# Patient Record
Sex: MALE | Race: OTHER | URBAN - METROPOLITAN AREA
[De-identification: names, ages, dates, MRNs, and addresses within clinical notes are randomized per-mention and may not be internally consistent; named-entity substitution may affect disease eponyms.]

---

## 2022-05-18 ENCOUNTER — EMERGENCY (EMERGENCY)
Facility: HOSPITAL | Age: 42
LOS: 0 days | Discharge: HOME | End: 2022-05-18
Attending: EMERGENCY MEDICINE | Admitting: EMERGENCY MEDICINE
Payer: MEDICAID

## 2022-05-18 VITALS
DIASTOLIC BLOOD PRESSURE: 80 MMHG | HEART RATE: 68 BPM | RESPIRATION RATE: 19 BRPM | HEIGHT: 65 IN | OXYGEN SATURATION: 99 % | TEMPERATURE: 97 F | WEIGHT: 152.12 LBS | SYSTOLIC BLOOD PRESSURE: 160 MMHG

## 2022-05-18 DIAGNOSIS — N48.89 OTHER SPECIFIED DISORDERS OF PENIS: ICD-10-CM

## 2022-05-18 DIAGNOSIS — R30.0 DYSURIA: ICD-10-CM

## 2022-05-18 LAB
APPEARANCE UR: CLEAR — SIGNIFICANT CHANGE UP
BILIRUB UR-MCNC: NEGATIVE — SIGNIFICANT CHANGE UP
COLOR SPEC: SIGNIFICANT CHANGE UP
DIFF PNL FLD: NEGATIVE — SIGNIFICANT CHANGE UP
GLUCOSE UR QL: NEGATIVE — SIGNIFICANT CHANGE UP
KETONES UR-MCNC: NEGATIVE — SIGNIFICANT CHANGE UP
LEUKOCYTE ESTERASE UR-ACNC: NEGATIVE — SIGNIFICANT CHANGE UP
NITRITE UR-MCNC: NEGATIVE — SIGNIFICANT CHANGE UP
PH UR: 6 — SIGNIFICANT CHANGE UP (ref 5–8)
PROT UR-MCNC: NEGATIVE — SIGNIFICANT CHANGE UP
SP GR SPEC: 1.02 — SIGNIFICANT CHANGE UP (ref 1.01–1.03)
UROBILINOGEN FLD QL: SIGNIFICANT CHANGE UP

## 2022-05-18 PROCEDURE — 99283 EMERGENCY DEPT VISIT LOW MDM: CPT

## 2022-05-18 RX ORDER — PENICILLIN G BENZATHINE 1200000 [IU]/2ML
2.4 INJECTION, SUSPENSION INTRAMUSCULAR ONCE
Refills: 0 | Status: COMPLETED | OUTPATIENT
Start: 2022-05-18 | End: 2022-05-18

## 2022-05-18 RX ADMIN — PENICILLIN G BENZATHINE 2.4 MILLION UNIT(S): 1200000 INJECTION, SUSPENSION INTRAMUSCULAR at 22:16

## 2022-05-18 NOTE — ED PROVIDER NOTE - PHYSICAL EXAMINATION
CONSTITUTIONAL: Well-developed; well-nourished; in no acute distress, nontoxic appearing  SKIN: skin exam is warm and dry  ABD: soft; non-distended; non-tender.   : discoloration to glans of penis without erythema/warmth or fluctuance, no discharge. Performed by Dr Triplett  NEURO: awake, alert, following commands, oriented, grossly unremarkable. No Focal deficits. GCS 15.   PSYCH: Cooperative, appropriate.

## 2022-05-18 NOTE — ED PROVIDER NOTE - PATIENT PORTAL LINK FT
You can access the FollowMyHealth Patient Portal offered by [facility] by registering at the following website: [email or web address]. By joining [facility]’s FollowMyHealth portal, you will also be able to view your health information using other applications (apps) compatible with our system.

## 2022-05-18 NOTE — ED PROVIDER NOTE - PROVIDER TOKENS
PROVIDER:[TOKEN:[[id number]],FOLLOWUP:[1-3 Days]],PROVIDER:[TOKEN:[[id number]],FOLLOWUP:[1-3 Days]]

## 2022-05-18 NOTE — ED ADULT TRIAGE NOTE - CHIEF COMPLAINT QUOTE
" I think I have infection on my penis, the skin was off and it hurts jennifer when I urinate for a week."

## 2022-05-18 NOTE — ED PROVIDER NOTE - NS ED ROS FT
Review of Systems:  	•	CONSTITUTIONAL: no fever  	•	SKIN: no rash  	•	GI: no abd pain, no nausea, no vomiting  	•	GENITO-URINARY: +dysuria, no testicular pain/redness. no penile discharge  	•	MUSCULOSKELETAL: no joint paint, no swelling, no redness  	•	NEUROLOGIC: no weakness, no headache   	•	PSYCH: no anxiety, non suicidal, non homicidal, no hallucination, no depression

## 2022-05-18 NOTE — ED PROVIDER NOTE - CARE PROVIDER_API CALL
Fernando Pillai)  Urology  [address] [place] [address], [address]  [place] [address]  Phone: [phone]  Fax: [phone]  Follow Up Time: 1-3 Days    Andrews Renee)  Urology  [address] [place] [address], [facility] [address]  [place] [address]  Phone: [phone]  Fax: [phone]  Follow Up Time: 1-3 Days

## 2022-05-18 NOTE — ED ADULT NURSE NOTE - CAS DISCH CONDITION
Emergency Department Discharge Information for Ladonna Dougherty was seen in the [facility] Emergency Department today for viral pharyngitis by Dr. Byrd.    We recommend that you rest, drink a lot of fluids. Recommended if persistent fever, vomiting, dehydration, difficulty in breathing or any changes or worsening of symptoms needs to come back for further evaluation or else follow up with the PCP in 2-3 days. Parents verbalized understanding and didn't have any further questions.             Medication side effect information:  All medicines may cause side effects. However, most people have no side effects or only have minor side effects.     People can be allergic to any medicine. Signs of an allergic reaction include rash, difficulty breathing or swallowing, wheezing, or unexplained swelling. If she has difficulty breathing or swallowing, call 911 or go right to the Emergency Department. For rash or other concerns, call her doctor.     If you have questions about side effects, please ask our staff. If you have questions about side effects or allergic reactions after you go home, ask your doctor or a pharmacist.     Some possible side effects of the medicines we are recommending for Ladonna are:     Ibuprofen  (Motrin, Advil. For fever or pain.)  - Upset stomach or vomiting  - Long term use may cause bleeding in the stomach or intestines. See her doctor if she has black or bloody vomit or stool (poop).      
Improved

## 2022-05-18 NOTE — ED PROVIDER NOTE - ATTENDING CONTRIBUTION TO CARE
42 y M to ED with penile pain.  pain with demarcation to dorsum of penis at 12 oclock with darkening of skin.  no d/c, no bleeding, no STI risk

## 2022-05-18 NOTE — ED PROVIDER NOTE - OBJECTIVE STATEMENT
42 year old male, no past medical history, who presents with penile pain. patient endorses penile pain x1 month described as irritation with dysuria prompting ed eval. denies fever, chills, abd pain, back pain, nausea/vomiting, testicular pain/swelling, bowel changes. patient sexually active, one partner ~20 years, no hx sti/stds.

## 2022-05-18 NOTE — ED PROVIDER NOTE - CARE PROVIDERS DIRECT ADDRESSES
[email or web address]

## 2022-05-18 NOTE — ED PROVIDER NOTE - NSFOLLOWUPINSTRUCTIONS_ED_ALL_ED_FT
Penis Problems  Itchy Penis and Penis Pain  Authored by Dr Marissa Guillermo MBE, Reviewed by Dr Donnie Julian | Last edited 7 Sep 2021 | Meets Patient’s editorial guidelines  In this series:   Premature Ejaculation     Balanitis     Peyronie's Disease (Bent Penis)     Hypospadias     Penile Cancer     Circumcision     Phimosis and Paraphimosis  There are a number of  penis problems that can result in penis pain, an itchy penis or general penile discomfort. Some penis problems to be aware of and the symptoms to watch out for are:    IN THIS ARTICLE  Itchy penis  Penis pain  Peyronie's disease  Hypospadias  Discharge from the penis  Lumps on the penis  Circumcision  Erection dysfunction  Penile cancer  Itchy penis  An itchy penis is often due to a fungal infection such as thrush. This is also known as candidiasis. It can be a sign of diabetes mellitus.    Alternatively, an itchy penis can sometimes be a symptom of a condition called balanitis. This is the term used to describe inflammation at the head of the penis, which can also cause penis pain. The tip of the penis may also be red or sore. In addition, you may notice pain when you pass water or make love and sometimes bleeding or a smell coming from your foreskin, which usually develops over a few days.    Balanitis can be caused by poor hygiene and infections picked up during sexual activity or sexual intercourse, such as genital herpes, genital warts and crabs (public lice). It can also be caused by infections not linked to sexual activity, such as thrush.    Our separate leaflet on Balanitis has more information.    Infections that can result in an itchy penis and affect the shaft of the penis include thrush, allergies or irritants, or as part of a more generalised skin problem such as psoriasis.    An itchy penis can sometimes be a cause of a lower urinary tract infection in men (UTI), which can again be a source of penile discomfort and penis pain.    A doctor can often diagnose the condition just by its appearance, but sometimes tests are needed. The treatment depends on the cause.    Patient.info  Our picks for Penis Problems (Itchy Penis and Penis Pain)  Penis problems you shouldn't ignore  Changes in the penis, testicles, scrotum and urinary habits can indicate something is wrong and ...     5min  Red spots? Fleshy bumps? When to worry about spots on the penis 5min  The male reproductive system  How erection problems can affect your relationship 4min  Penis pain  There are several causes of penis pain which we discuss in more detail below. Causes of penis pain include:    An injury due to a fall or during sports.  Strenuous sexual activity.  Sexually transmitted infections.  Non-sexually transmitted infections, such as yeast infections leading to balanitis.  A paraphimosis, where the foreskin has been pulled back and gets stuck (this is a medical emergency).  Peyronie's disease.  Peyronie's disease  In this condition, thickened areas of scar tissue (fibrous plaques) appear along the shaft of the penis. As a result the penis can develop a bend or assume a deformed shape. The exact cause is unknown but there are several theories. With Peyronie's disease, painful erections and problems with penetration during sex are common.    In most cases tests are not needed, but occasionally a scan is suggested if a blood circulation problem affecting the penis is suspected. The medical treatment includes:    Medication that can be swallowed; or  Medicines applied directly to the surface of the penis; or  Injections into the areas of scarring.  Other treatment options are available. In most cases, the condition stays the same or becomes worse, but occasionally it gets better with time.    See the separate leaflet called Peyronie's Disease for additional information.    Hypospadias  Your urethra is the tube running from your bladder down the length of your penis, which your urine comes out of. Hypospadias is a malformation of the urethra and penis that some babies are born with. It can cause difficulties with the urine flow. In later life, there may be erection problems.    There are a number of different forms. In mild cases, the urethra may just open lower down from its usual position. In severe forms, it can open right down at the base of the penis, by the scrotum. Other abnormalities of the foreskin or the underside of the penis may also occur.    Tests are not usually needed unless it is suspected that the abnormalities of the penis are part of a wider disorder (such as 'intersex syndrome'). Treatment may not be needed for milder cases, but many patients require some form of corrective surgery. This is particularly true if there are problems with the direction of urine flow or erection difficulties.    See the separate leaflet called Hypospadias for additional information.    Worried about your health?  Find a range of men's health services, delivered by local professionals today    Book now  Discharge from the penis  Discharge from the penis is usually caused by inflammation of the urethra. The medical term is urethritis. You may notice a burning pain or urge to pass urine, but this doesn't always happen. Some men don't get any symptoms.    Usually, the cause is a sexually transmitted disease or infection. The common causes are gonorrhoea and non-gonococcal urethritis (ONEIL). It's at its most common in younger men, especially men who have sex with men.    As the name suggests, infections other than gonorrhoea can be a cause of ONEIL. Chlamydia, a germ (bacterium) is a common cause. Irritants to the urethra, such as soap, spermicides or putting a tube (catheter) in to drain the bladder can also be to blame. A cause can't always be identified.    If an infection is the cause, it may go away without medical treatment, but this can take several months. You may still be infectious, even if the symptoms have gone.    If you have a discharge from the penis, get checked out by your GP, a sexual health clinic, or a genitourinary medicine (GUM) clinic. Don't have sex until you and your partner(s) have been tested. Even after treatment, you must avoid sex until it's shown that any infection has been cleared by treatment.    See the separate leaflets called Gonorrhoea, Non-gonococcal Urethritis and Urethritis and Urethral Discharge in Men for additional information.    Lumps on the penis  A common cause for these is warts, also called anogenital warts or simply genital warts. They look like little lumps on the outer skin of the penis. They also sometimes occur around the anus. They are caused by infection with the human papillomavirus (HPV). They are passed on by skin-to-skin contact. This is mainly during sexual contact, but can occur in other circumstances (eg, from mother to baby during birth). Warts on the penis usually don't cause any penis pain or symptoms but people seek treatment because of their appearance. It's best to visit a GUM clinic to get checked out for other infections.    Genital warts are not cancers, but the risk of some cancers (genital and penile cancer, cancer of the mouth, throat and neck). HPV infection is linked to cervical cancer in women.    The warts can be left alone, or removed by a variety of methods such as chemicals, freezing, burning, lasers or surgery. New warts develop after treatment in 1 in 4 cases, due to reactivation of the infection.    Condoms - both male and female - help reduce the risk of acquiring genital warts but don't abolish it because of contact with uncovered skin. Sex toys can pass on the virus and should not be shared. A vaccine is now available against HPV infection which is offered to boys and girls aged 12 to 13 years.    See the separate leaflet called Anogenital Warts for additional information.    Circumcision  This is an operation in which the foreskin is cut off and the remaining skin stitched back. It is usually performed for a condition called phimosis. Phimosis means tightening of the foreskin. This can result in difficulties in pulling the foreskin over the head of the penis and sometimes penis pain.    If the foreskin is pulled back for cleaning or insertion of a tube (catheter) to drain the bladder, and it is not pulled forwards again, the head of the penis (glans) can swell. The foreskin can become trapped under the swollen glans: this is called a paraphimosis. It's not uncommon for this to result in penis pain.    Steroid cream or an operation to loosen the foreskin can sometimes be used if it is not too tight. Otherwise, a circumcision is required.    Sometimes a circumcision is done for Oriental orthodox or hygienic reasons. Other reasons include:    An infection of the glans, called balanitis (see above).  An infection of the foreskin, called posthitis.  A skin condition called balanitis xerotica obliterans.  See the separate leaflet called Circumcision for additional information.    Erection dysfunction  This is also called impotence. ED means that    You can't maintain an erection long enough to complete sexual intercourse; or  You're unable to get an erection at all.  A lot of men have ED now and again, often as a result of stress or tiredness. It can, however, become a more persistent problem. Long-term ED is more likely to happen as you become older.    There are many causes of persistent ED, including circulation problems to the penis, diseases of (or injury to) the nerve supply, diabetes, and lack of the male hormone (testosterone). Other causes include alcohol and recreational drugs, cycling, leakage of blood through the veins of the penis, and psychological causes.    If your ED is due to a circulatory cause (as many cases are) you will need checks to make sure you don't have a more general problem with the state of your circulatory system, including your heart. These may include blood tests to rule out raised cholesterol and diabetes, a blood pressure check, and a heart tracing (electrocardiogram, or ECG). Your lifestyle will also be reviewed (eg, weight, exercise, smoking and alcohol intake).    Treatment options will include removing any of the aggravating factors (such as excessive alcohol or stress); medication in the form of cream, or tablets that you swallow; and injections or pellets put in the urethra. Other options include vacuum devices or a shelbie inserted into the penis (a prosthesis).    See the separate leaflet called Erectile Dysfunction (Impotence) for additional information.     Penile cancer  This is also known as penile cancer. It is very rare, affecting 1 in 100,000 men in [place]. A cancerous (malignant) tumour grows from one abnormal cell. It is not known why this happens but certain factors are known to increase the risk. These include age (it is more common over the age of 50), becoming infected with the human papillomavirus (HPV), and certain rare skin conditions (eg, erythroplasia of [place] and balanitis xerotica obliterans).    Poor hygiene in adults and having phimosis can increase the risk. Having a circumcision as a baby seems to have a protective effect.    The first sign of penile cancer is an alteration in the colour of the skin either on the head of the penis (the glans) or the underside of the foreskin if you have not been circumcised. It never occurs on the shaft. Eventually, the whole of the glans and/or foreskin can become involved.    You will probably need removal of some tissue for analysis (a biopsy) and some scans to confirm the size and spread of the cancer.    Treatment options include surgery, radiotherapy and chemotherapy.

## 2022-05-18 NOTE — ED PROVIDER NOTE - NS ED ATTENDING STATEMENT MOD
I have seen and examined this patient and fully participated in the care of this patient as the teaching attending.  The service was shared with the YRIS.  I reviewed and verified the documentation and independently performed the documented:

## 2022-05-19 LAB
C TRACH RRNA SPEC QL NAA+PROBE: SIGNIFICANT CHANGE UP
N GONORRHOEA RRNA SPEC QL NAA+PROBE: SIGNIFICANT CHANGE UP
SPECIMEN SOURCE: SIGNIFICANT CHANGE UP
T PALLIDUM AB TITR SER: NEGATIVE — SIGNIFICANT CHANGE UP

## 2022-05-20 LAB
CULTURE RESULTS: NO GROWTH — SIGNIFICANT CHANGE UP
SPECIMEN SOURCE: SIGNIFICANT CHANGE UP

## 2022-08-08 PROBLEM — Z00.00 ENCOUNTER FOR PREVENTIVE HEALTH EXAMINATION: Status: ACTIVE | Noted: 2022-08-08

## 2022-08-10 ENCOUNTER — APPOINTMENT (OUTPATIENT)
Dept: UROLOGY | Facility: CLINIC | Age: 42
End: 2022-08-10

## 2022-08-10 DIAGNOSIS — Z78.9 OTHER SPECIFIED HEALTH STATUS: ICD-10-CM

## 2022-08-10 DIAGNOSIS — N47.7 OTHER INFLAMMATORY DISEASES OF PREPUCE: ICD-10-CM

## 2022-08-10 LAB
BILIRUB UR QL STRIP: NORMAL
CLARITY UR: CLEAR
COLLECTION METHOD: NORMAL
GLUCOSE UR-MCNC: NORMAL
HCG UR QL: 0.2 EU/DL
HGB UR QL STRIP.AUTO: NORMAL
KETONES UR-MCNC: NORMAL
LEUKOCYTE ESTERASE UR QL STRIP: NORMAL
NITRITE UR QL STRIP: NORMAL
PH UR STRIP: 5.5
PROT UR STRIP-MCNC: NORMAL
SP GR UR STRIP: 1.03

## 2022-08-10 PROCEDURE — 81003 URINALYSIS AUTO W/O SCOPE: CPT | Mod: QW

## 2022-08-10 PROCEDURE — 99203 OFFICE O/P NEW LOW 30 MIN: CPT

## 2022-09-24 ENCOUNTER — EMERGENCY (EMERGENCY)
Facility: HOSPITAL | Age: 42
LOS: 0 days | Discharge: HOME | End: 2022-09-24
Attending: STUDENT IN AN ORGANIZED HEALTH CARE EDUCATION/TRAINING PROGRAM | Admitting: STUDENT IN AN ORGANIZED HEALTH CARE EDUCATION/TRAINING PROGRAM

## 2022-09-24 VITALS
DIASTOLIC BLOOD PRESSURE: 90 MMHG | SYSTOLIC BLOOD PRESSURE: 177 MMHG | HEIGHT: 65 IN | WEIGHT: 151.68 LBS | OXYGEN SATURATION: 100 % | HEART RATE: 75 BPM | RESPIRATION RATE: 18 BRPM | TEMPERATURE: 98 F

## 2022-09-24 DIAGNOSIS — N48.1 BALANITIS: ICD-10-CM

## 2022-09-24 DIAGNOSIS — N48.29 OTHER INFLAMMATORY DISORDERS OF PENIS: ICD-10-CM

## 2022-09-24 PROCEDURE — 99284 EMERGENCY DEPT VISIT MOD MDM: CPT

## 2022-09-24 NOTE — ED ADULT NURSE NOTE - NSIMPLEMENTINTERV_GEN_ALL_ED
Implemented All Universal Safety Interventions:  [place] to call system. Call bell, personal items and telephone within reach. Instruct patient to call for assistance. Room bathroom lighting operational. Non-slip footwear when patient is off stretcher. Physically safe environment: no spills, clutter or unnecessary equipment. Stretcher in lowest position, wheels locked, appropriate side rails in place.

## 2022-09-24 NOTE — ED PROVIDER NOTE - CLINICAL SUMMARY MEDICAL DECISION MAKING FREE TEXT BOX
42-year-old male presented today with symptomatology consistent balanitis.  Patient instructed on hygiene and skin care and discharged to follow-up with PMD.

## 2022-09-24 NOTE — ED PROVIDER NOTE - PATIENT PORTAL LINK FT
You can access the FollowMyHealth Patient Portal offered by [facility] by registering at the following website: [email or web address]. By joining [facility]’s FollowMyHealth portal, you will also be able to view your health information using other applications (apps) compatible with our system.

## 2022-09-24 NOTE — ED PROVIDER NOTE - PHYSICAL EXAMINATION
CONSTITUTIONAL: NAD  SKIN: Warm dry  HEAD: NCAT  EYES: NL inspection  ENT: MMM  NECK: Supple; non tender.  CARD: RRR  RESP: CTAB  ABD: S/NT no R/G  EXT: no pedal edema  NEURO: Grossly unremarkable  PSYCH: Cooperative, appropriate.  : Chaperone Dr Kang  +mild erythema/irritation to glans, nonttp   +mild erythema at base of penis  +hyperpigmented nonttp circular lesion on penis   no penile discharge, testicles nonttp

## 2022-09-24 NOTE — ED PROVIDER NOTE - NSFOLLOWUPINSTRUCTIONS_ED_ALL_ED_FT
Balanitis    WHAT YOU NEED TO KNOW:    Balanitis is inflammation of the glans (head) of the penis. It is usually caused by bacteria or a fungus.    DISCHARGE INSTRUCTIONS:    Medicines:     Medicines help fight or prevent an infection caused by bacteria or a fungus. This medicine may be given as a pill or a cream.    Take your medicine as directed. Contact your healthcare provider if you think your medicine is not helping or if you have side effects. Tell him of her if you are allergic to any medicine. Keep a list of the medicines, vitamins, and herbs you take. Include the amounts, and when and why you take them. Bring the list or the pill bottles to follow-up visits. Carry your medicine list with you in case of an emergency.    Clean your penis carefully: Gently push back the foreskin 2 to 3 times a day and wash the infected area well with soap and water. If you have a catheter, ask how to keep it clean.    Take a sitz bath: Fill a bathtub with 4 to 6 inches of warm water. You may also use a sitz bath pan that fits over a toilet. Sit in the sitz bath for 20 minutes. Do this 2 to 3 times a day, or as directed. The warm water can help decrease pain and swelling.     Maintain a healthy weight: Ask your healthcare provider how much you should weigh. Ask him to help you create a weight loss plan if you are overweight.     Follow up with your healthcare provider in 1 to 2 weeks: Write down your questions so you remember to ask them during your visits.    Contact your healthcare provider if:     You have a fever.      You have pain when you urinate.      You have questions or concerns about your condition or care.    Return to the emergency department if:     You are not able to urinate.              © Copyright [employer] 2019 All illustrations and images included in CareNotes are the copyrighted property of [employer] or [employer].

## 2022-09-24 NOTE — ED PROVIDER NOTE - OBJECTIVE STATEMENT
41 yo m co penile irritation x 2 days. Patient c/o irritation to the glans, worse when riding a bike. Admits to similar sxs in the past that resolved on its own, had f/u with Derm.   Denies fevers/chills, abd pain nvd, abd pain, dysuria, urinary freq/urg  Denies penile discharge, ho STDs.     Pt is sexually active w 1 female partner

## 2023-02-28 ENCOUNTER — EMERGENCY (EMERGENCY)
Facility: HOSPITAL | Age: 43
LOS: 0 days | Discharge: HOME | End: 2023-02-28
Attending: EMERGENCY MEDICINE
Payer: MEDICAID

## 2023-02-28 VITALS
HEART RATE: 65 BPM | DIASTOLIC BLOOD PRESSURE: 70 MMHG | OXYGEN SATURATION: 100 % | RESPIRATION RATE: 20 BRPM | SYSTOLIC BLOOD PRESSURE: 135 MMHG | TEMPERATURE: 98 F

## 2023-02-28 DIAGNOSIS — R30.0 DYSURIA: ICD-10-CM

## 2023-02-28 DIAGNOSIS — R21 RASH AND OTHER NONSPECIFIC SKIN ERUPTION: ICD-10-CM

## 2023-02-28 LAB
APPEARANCE UR: CLEAR — SIGNIFICANT CHANGE UP
BILIRUB UR-MCNC: NEGATIVE — SIGNIFICANT CHANGE UP
COLOR SPEC: SIGNIFICANT CHANGE UP
DIFF PNL FLD: NEGATIVE — SIGNIFICANT CHANGE UP
GLUCOSE UR QL: NEGATIVE — SIGNIFICANT CHANGE UP
KETONES UR-MCNC: NEGATIVE — SIGNIFICANT CHANGE UP
LEUKOCYTE ESTERASE UR-ACNC: NEGATIVE — SIGNIFICANT CHANGE UP
NITRITE UR-MCNC: NEGATIVE — SIGNIFICANT CHANGE UP
PH UR: 6 — SIGNIFICANT CHANGE UP (ref 5–8)
PROT UR-MCNC: NEGATIVE — SIGNIFICANT CHANGE UP
SP GR SPEC: 1.01 — SIGNIFICANT CHANGE UP (ref 1.01–1.03)
UROBILINOGEN FLD QL: SIGNIFICANT CHANGE UP

## 2023-02-28 PROCEDURE — 87086 URINE CULTURE/COLONY COUNT: CPT

## 2023-02-28 PROCEDURE — 99284 EMERGENCY DEPT VISIT MOD MDM: CPT

## 2023-02-28 PROCEDURE — 81003 URINALYSIS AUTO W/O SCOPE: CPT

## 2023-02-28 NOTE — ED PROVIDER NOTE - NS ED ATTENDING STATEMENT MOD
This was a shared visit with the YRIS. I reviewed and verified the documentation and independently performed the documented:

## 2023-02-28 NOTE — ED PROVIDER NOTE - OBJECTIVE STATEMENT
Pt is a 43 y/o male with no PMHx presenting for burning with urination with symptoms beginning approx. 1 year ago. Pt was seen in the ED previously and had testing done which was negative. He has seen urology and they did have any significant findings on exam. Urology note reviewed in HIE. He denies any other complaints of fever, chills, cough, sore throat, N/V/D/C, abdominal pain, or other urinary complaints such as hematuria. He states he is not currently sexually active and has not ever been treated for STDs.

## 2023-02-28 NOTE — ED PROVIDER NOTE - ATTENDING APP SHARED VISIT CONTRIBUTION OF CARE
42-year-old male now presents with burning and dysuria x1 year still complaining of symptoms.    Exam with chaperone, no phimosis or paraphimosis, no penile lesions, no testicular tenderness or swelling.Abdomen soft nondistended nontender

## 2023-02-28 NOTE — ED ADULT NURSE NOTE - OBJECTIVE STATEMENT
Patient had phone appt with provider today. The following is copied and pasted from that appt note:  \" Recommendations:  1. Continue with comprehensive treatment in the Back and Spine Program as previously outlined.  2. Maintain a daily home exercise program.  3. Continue to incorporate proper lifting techniques and spinal body mechanics.  4. Acute self-care techniques for pain reduction were reviewed today.  5. Medications: none new; trial voltaren gel, if desired; Tylenol 1000mg TID PRN for pain flairs.  6. Imaging/Diagnostics: none new.  7. Injections: deferred; consider repeat cTPIs  8. Referrals: none new.  9. Misc: none.     Medications, Orders, and/or Diagnostics Prescribed at clinical visit:  No orders of the defined types were placed in this encounter.     Follow up:   Program status: Continue in Back and Spine Program with Dr. Beaulieu in [place] as necessary/desired.\"         Per RN chart review, no new orders placed and no follow up is needed.  No information needs to get to patient.       rash with burning with urination

## 2023-02-28 NOTE — ED PROVIDER NOTE - PHYSICAL EXAMINATION
As Follows:  CONST: Well appearing in NAD.   EYES: EOMI, Sclera and conjunctiva clear  CARD: Normal S1 S2; Normal rate and rhythm  RESP: Equal BS B/L, No wheezes, rhonchi or rales. No distress  GI: Soft, non-tender, non-distended.  : No lesions, rashes, discharge, or other signs of infection. Nontender testicles. Circumcised.   MS: Normal ROM in all extremities.   SKIN: Warm, dry, no acute rashes. Good turgor  NEURO: A&Ox3, No focal deficits. Steady gait

## 2023-02-28 NOTE — ED PROVIDER NOTE - PATIENT PORTAL LINK FT
You can access the FollowMyHealth Patient Portal offered by [facility] by registering at the following website: [email or web address]. By joining [facility]’s FollowMyHealth portal, you will also be able to view your health information using other applications (apps) compatible with our system.

## 2023-02-28 NOTE — ED PROVIDER NOTE - CARE PROVIDER_API CALL
Fernando Arellano (MD)  Dermatology; Internal Medicine  [address] [place] [address]  Phone: [phone]  Fax: [phone]  Follow Up Time: 4-6 Days

## 2023-03-01 LAB
CULTURE RESULTS: NO GROWTH — SIGNIFICANT CHANGE UP
SPECIMEN SOURCE: SIGNIFICANT CHANGE UP

## 2024-12-02 ENCOUNTER — EMERGENCY (EMERGENCY)
Facility: HOSPITAL | Age: 44
LOS: 0 days | Discharge: HOME | End: 2024-12-03
Attending: EMERGENCY MEDICINE
Payer: MEDICAID

## 2024-12-02 VITALS
HEIGHT: 67 IN | WEIGHT: 149.91 LBS | DIASTOLIC BLOOD PRESSURE: 90 MMHG | OXYGEN SATURATION: 98 % | HEART RATE: 10 BPM | RESPIRATION RATE: 18 BRPM | SYSTOLIC BLOOD PRESSURE: 151 MMHG | TEMPERATURE: 98 F

## 2024-12-02 VITALS — OXYGEN SATURATION: 99 % | HEART RATE: 78 BPM | RESPIRATION RATE: 18 BRPM

## 2024-12-02 DIAGNOSIS — L53.9 ERYTHEMATOUS CONDITION, UNSPECIFIED: ICD-10-CM

## 2024-12-02 DIAGNOSIS — L85.3 XEROSIS CUTIS: ICD-10-CM

## 2024-12-02 DIAGNOSIS — L03.113 CELLULITIS OF RIGHT UPPER LIMB: ICD-10-CM

## 2024-12-02 PROCEDURE — 99283 EMERGENCY DEPT VISIT LOW MDM: CPT | Mod: 25

## 2024-12-02 PROCEDURE — 99283 EMERGENCY DEPT VISIT LOW MDM: CPT

## 2024-12-02 PROCEDURE — 96372 THER/PROPH/DIAG INJ SC/IM: CPT

## 2024-12-02 RX ORDER — DIPHENHYDRAMINE HCL 25 MG
50 CAPSULE ORAL ONCE
Refills: 0 | Status: COMPLETED | OUTPATIENT
Start: 2024-12-02 | End: 2024-12-02

## 2024-12-02 RX ORDER — DEXAMETHASONE 1.5 MG/1
10 TABLET ORAL ONCE
Refills: 0 | Status: COMPLETED | OUTPATIENT
Start: 2024-12-02 | End: 2024-12-02

## 2024-12-02 RX ORDER — CEPHALEXIN 500 MG
1 CAPSULE ORAL
Qty: 40 | Refills: 0
Start: 2024-12-02 | End: 2024-12-11

## 2024-12-02 RX ADMIN — Medication 50 MILLIGRAM(S): at 23:29

## 2024-12-02 RX ADMIN — DEXAMETHASONE 10 MILLIGRAM(S): 1.5 TABLET ORAL at 23:29

## 2024-12-02 NOTE — ED PROVIDER NOTE - NSFOLLOWUPINSTRUCTIONS_ED_ALL_ED_FT
Please follow up with your primary care doctor and dermatologist in 1-7 days   Please be aware of any new or worsening signs or symptoms that should prompt your return to the ER.      CELLULITIS - Discharge Care     Cellulitis    WHAT YOU NEED TO KNOW:    Cellulitis is a skin infection caused by bacteria. Cellulitis may go away on its own or you may need treatment. Your healthcare provider may draw a [place] around the outside edges of your cellulitis. If your cellulitis spreads, your healthcare provider will see it outside of the [place]. Cellulitis          DISCHARGE INSTRUCTIONS:    Call 911 if:     You have sudden trouble breathing or chest pain.        Seek care immediately if:     Your wound gets larger and more painful.       You feel a crackling under your skin when you touch it.      You have purple dots or bumps on your skin, or you see bleeding under your skin.      You have new swelling and pain in your legs.      The red, warm, swollen area gets larger.      You see red streaks coming from the infected area.    Contact your healthcare provider if:     You have a fever.      Your fever or pain does not go away or gets worse.      The area does not get smaller after 2 days of antibiotics.      Your skin is flaking or peeling off.      You have questions or concerns about your condition or care.    Medicines:     Antibiotics help treat the bacterial infection.       NSAIDs, such as ibuprofen, help decrease swelling, pain, and fever. NSAIDs can cause stomach bleeding or kidney problems in certain people. If you take blood thinner medicine, always ask if NSAIDs are safe for you. Always read the medicine label and follow directions. Do not give these medicines to children under 6 months of age without direction from your child's healthcare provider.      Acetaminophen decreases pain and fever. It is available without a doctor's order. Ask how much to take and how often to take it. Follow directions. Read the labels of all other medicines you are using to see if they also contain acetaminophen, or ask your doctor or pharmacist. Acetaminophen can cause liver damage if not taken correctly. Do not use more than 4 grams (4,000 milligrams) total of acetaminophen in one day.       Take your medicine as directed. Contact your healthcare provider if you think your medicine is not helping or if you have side effects. Tell him or her if you are allergic to any medicine. Keep a list of the medicines, vitamins, and herbs you take. Include the amounts, and when and why you take them. Bring the list or the pill bottles to follow-up visits. Carry your medicine list with you in case of an emergency.    Self-care:     Elevate the area above the level of your heart as often as you can. This will help decrease swelling and pain. Prop the area on pillows or blankets to keep it elevated comfortably.       Clean the area daily until the wound scabs over. Gently wash the area with soap and water. Pat dry. Use dressings as directed.       Place cool or warm, wet cloths on the area as directed. Use clean cloths and clean water. Leave it on the area until the cloth is room temperature. Pat the area dry with a clean, dry cloth. The cloths may help decrease pain.     Prevent cellulitis:     Do not scratch bug bites or areas of injury. You increase your risk for cellulitis by scratching these areas.       Do not share personal items, such as towels, clothing, and razors.       Clean exercise equipment with germ-killing  before and after you use it.      Wash your hands often. Use soap and water. Wash your hands after you use the bathroom, change a child's diapers, or sneeze. Wash your hands before you prepare or eat food. Use lotion to prevent dry, cracked skin. Handwashing           Wear pressure stockings as directed. You may be told to wear the stockings if you have peripheral edema. The stockings improve blood flow and decrease swelling.      Treat athlete’s foot. This can help prevent the spread of a bacterial skin infection.    Follow up with your healthcare provider within 3 days, or as directed: Your healthcare provider will check if your cellulitis is getting better. You may need different medicine. Write down your questions so you remember to ask them during your visits.

## 2024-12-02 NOTE — ED PROVIDER NOTE - PHYSICAL EXAMINATION
CONSTITUTIONAL: non-toxic appearing male,nad  SKIN: erythema and warmth to dorsum of right forearm with overlying excoriation, no weeping/drainage/bleeding/fluctuance  HEAD: Normocephalic; atraumatic.  ENT: MMM  NECK: Supple; non tender.  ROM intact.  EXT: Normal ROM.   NEURO: awake, alert, following commands, oriented, grossly unremarkable. No Focal deficits. GCS 15.   PSYCH: Cooperative, appropriate.

## 2024-12-02 NOTE — ED PROVIDER NOTE - PATIENT PORTAL LINK FT
You can access the FollowMyHealth Patient Portal offered by [facility] by registering at the following website: [email or web address]. By joining [employer]’s FollowMyHealth portal, you will also be able to view your health information using other applications (apps) compatible with our system.

## 2024-12-02 NOTE — ED PROVIDER NOTE - PROVIDER TOKENS
PROVIDER:[TOKEN:[[id number]],FOLLOWUP:[4-6 Days]],PROVIDER:[TOKEN:[[id number]],FOLLOWUP:[1-3 Days]]

## 2024-12-02 NOTE — ED PROVIDER NOTE - CARE PROVIDER_API CALL
Alexus Barksdale  Allergy and Immunology  [address] [place] [address]  Phone: [phone]  Fax: [phone]  Follow Up Time: 4-6 Days    Fernando Castillo  Dermatology  [facility] [id number]  Phone: [phone]  Fax: ()-  Follow Up Time: 1-3 Days

## 2024-12-02 NOTE — ED PROVIDER NOTE - OBJECTIVE STATEMENT
44 year old male who presents for right arm redness. patient reports feeling dry skin to arm, groin and legs resulting in scratching areas. patient noticed redness and pain to right arm prompting ed eval. denies f/c, sore throat, nausea/vomiting.

## 2024-12-02 NOTE — ED PROVIDER NOTE - CARE PLAN
Principal Discharge DX:	Cellulitis   1 Principal Discharge DX:	Cellulitis  Assessment and plan of treatment:	superficial cellulitis of RUE  no concern for nec fasc.  abx, outpt follow up  return if worse.

## 2024-12-03 PROBLEM — Z78.9 OTHER SPECIFIED HEALTH STATUS: Chronic | Status: ACTIVE | Noted: 2023-02-28
